# Patient Record
Sex: FEMALE | Race: WHITE | NOT HISPANIC OR LATINO | URBAN - METROPOLITAN AREA
[De-identification: names, ages, dates, MRNs, and addresses within clinical notes are randomized per-mention and may not be internally consistent; named-entity substitution may affect disease eponyms.]

---

## 2025-07-24 ENCOUNTER — HOSPITAL ENCOUNTER (EMERGENCY)
Facility: HOSPITAL | Age: 55
Discharge: HOME | End: 2025-07-24
Attending: STUDENT IN AN ORGANIZED HEALTH CARE EDUCATION/TRAINING PROGRAM
Payer: COMMERCIAL

## 2025-07-24 ENCOUNTER — APPOINTMENT (OUTPATIENT)
Dept: RADIOLOGY | Facility: HOSPITAL | Age: 55
End: 2025-07-24
Payer: COMMERCIAL

## 2025-07-24 VITALS
BODY MASS INDEX: 32.78 KG/M2 | DIASTOLIC BLOOD PRESSURE: 91 MMHG | SYSTOLIC BLOOD PRESSURE: 117 MMHG | OXYGEN SATURATION: 98 % | HEIGHT: 64 IN | TEMPERATURE: 98.1 F | RESPIRATION RATE: 16 BRPM | HEART RATE: 80 BPM | WEIGHT: 192 LBS

## 2025-07-24 DIAGNOSIS — M25.561 ACUTE PAIN OF RIGHT KNEE: Primary | ICD-10-CM

## 2025-07-24 PROCEDURE — 99284 EMERGENCY DEPT VISIT MOD MDM: CPT | Mod: 25 | Performed by: STUDENT IN AN ORGANIZED HEALTH CARE EDUCATION/TRAINING PROGRAM

## 2025-07-24 PROCEDURE — 73564 X-RAY EXAM KNEE 4 OR MORE: CPT | Mod: RIGHT SIDE | Performed by: RADIOLOGY

## 2025-07-24 PROCEDURE — 96374 THER/PROPH/DIAG INJ IV PUSH: CPT

## 2025-07-24 PROCEDURE — 96375 TX/PRO/DX INJ NEW DRUG ADDON: CPT

## 2025-07-24 PROCEDURE — 73564 X-RAY EXAM KNEE 4 OR MORE: CPT | Mod: RT

## 2025-07-24 PROCEDURE — 2500000004 HC RX 250 GENERAL PHARMACY W/ HCPCS (ALT 636 FOR OP/ED): Performed by: STUDENT IN AN ORGANIZED HEALTH CARE EDUCATION/TRAINING PROGRAM

## 2025-07-24 RX ORDER — KETOROLAC TROMETHAMINE 30 MG/ML
15 INJECTION, SOLUTION INTRAMUSCULAR; INTRAVENOUS ONCE
Status: COMPLETED | OUTPATIENT
Start: 2025-07-24 | End: 2025-07-24

## 2025-07-24 RX ORDER — METHOCARBAMOL 100 MG/ML
750 INJECTION, SOLUTION INTRAMUSCULAR; INTRAVENOUS ONCE
Status: COMPLETED | OUTPATIENT
Start: 2025-07-24 | End: 2025-07-24

## 2025-07-24 RX ORDER — METHOCARBAMOL 750 MG/1
750 TABLET, FILM COATED ORAL 3 TIMES DAILY PRN
Qty: 14 TABLET | Refills: 0 | Status: SHIPPED | OUTPATIENT
Start: 2025-07-24

## 2025-07-24 RX ORDER — OXYCODONE HYDROCHLORIDE 5 MG/1
5 TABLET ORAL EVERY 6 HOURS PRN
Qty: 5 TABLET | Refills: 0 | Status: SHIPPED | OUTPATIENT
Start: 2025-07-24

## 2025-07-24 RX ADMIN — KETOROLAC TROMETHAMINE 15 MG: 30 INJECTION, SOLUTION INTRAMUSCULAR at 16:15

## 2025-07-24 RX ADMIN — METHOCARBAMOL 750 MG: 1000 INJECTION, SOLUTION INTRAMUSCULAR; INTRAVENOUS at 16:14

## 2025-07-24 ASSESSMENT — PAIN SCALES - GENERAL
PAINLEVEL_OUTOF10: 3
PAINLEVEL_OUTOF10: 10 - WORST POSSIBLE PAIN
PAINLEVEL_OUTOF10: 4
PAINLEVEL_OUTOF10: 0 - NO PAIN

## 2025-07-24 ASSESSMENT — PAIN DESCRIPTION - ORIENTATION: ORIENTATION: RIGHT

## 2025-07-24 ASSESSMENT — PAIN DESCRIPTION - LOCATION: LOCATION: KNEE

## 2025-07-24 ASSESSMENT — PAIN - FUNCTIONAL ASSESSMENT: PAIN_FUNCTIONAL_ASSESSMENT: 0-10

## 2025-07-24 NOTE — ED PROVIDER NOTES
"[facility] ED Note    Date of Service: 7/24/2025  Reason for Visit: Knee Injury      Patient History     [facility]  Marilu Cruz is a 55 y.o. female who presents the emergency department for right knee pain.  Patient was power washing her house when she tripped backwards over a , twisted her knee and landed on her buttocks.  Patient felt tearing in her right knee with immediate pain and possible swelling.  She did not hit her head, she did not lose consciousness.  She reports pain of her right knee.  She states she tried to walk but was unable to do so and unable to put weight on it.  She rates the pain as a 10/10, worse with movement and better with rest.      Medical History[1]  Surgical History[2]      Physical Exam     Vitals:    07/24/25 1527 07/24/25 1600 07/24/25 1715 07/24/25 1801   BP: 123/72 126/87 (!) 117/91 (!) 117/91   Pulse: 73 76 79 80   Resp: 16 (!) 26 15 16   Temp: 36.7 °C (98.1 °F)      SpO2: 100% 100% 97% 98%   Weight: 87.1 kg (192 lb)      Height: 1.626 m (5' 4\")        General: Age-appropriate female, nontoxic, sitting comfortably in the gurney no acute distress.  Pulmonary:   Non-labored breathing. Breath sounds clear bilaterally  Cardiac:  Regular rate   Abdomen:  Soft. Non-tender. Non-distended  Musculoskeletal: Tenderness palpation of the right knee without appreciable effusion.  No overlying erythema.  Sensation intact to light touch.  Right 2+ DP/PT pulse.  Skin:  Dry, no rashes  Neuro: Alert and oriented x 4.  Moves all 4 extremity spontaneously.    Diagnostic Studies     Labs:  Please see EMR for labs obtained during this patient encounter.    Radiology:  Please see EMR for imaging obtained during this patient encounter.    ED Course and MDM     Marilu Cruz is a 55 y.o. female with a history and presentation as described above in [facility].      Upon presentation, the patient was afebrile, well-appearing, with unremarkable vital signs.  Patient presented to the emerged part with " right knee pain after twisting it while falling.  Differential diagnose includes fracture, soft tissue injury such as meniscal tear, possible MCL tear possible ACL tear.  Patient did not have any knee hyperextension, low concern for any vascular injury.  Patient had a 2+ right DP/PT pulse.  Patient's exam was concerning for possible soft tissue injury.  She was treated with Robaxin as well as Toradol for her pain with improvement on reassessment.  Patient distally did not show any fracture.  Patient was given a knee brace as well as crutches.  Was provided orthopedic surgery follow-up, was given oxycodone as well as Robaxin to go home with and subsequent discharged.      Impression     Diagnoses as of 07/24/25 2008   Acute pain of right knee        Plan       Discharge, see DCI provided      Be Churchill MD  [facility] Emergency Medicine           [1] No past medical history on file.  [2] No past surgical history on file.       Be Churchill MD  07/24/25 2009

## 2025-07-24 NOTE — ED TRIAGE NOTES
PT here from home working in yard,  fell and hurt rt knee. Pt is tearful denies hitting head and LOC.  Was given 20mcg of ketamine and Zofran by EMS..  Pain 10/10. No medical history.

## 2025-07-24 NOTE — DISCHARGE INSTRUCTIONS
You are seen in the emergency department for right knee pain.  We discussed that you most likely have a soft tissue injury.  You may use the Robaxin 3 times a day as needed for muscle spasms.  You may also use the oxycodone for breakthrough pain.  Please do not operate any heavy machinery or drive long distances with the Robaxin or the oxycodone.  We did give her referral to orthopedic surgery, we will help you make an appointment for this.  You may bear weight as tolerated on your right leg.  You may use ice, rest, and elevation as needed as well.

## 2025-07-28 ENCOUNTER — DOCUMENTATION (OUTPATIENT)
Dept: ORTHOPEDIC SURGERY | Facility: HOSPITAL | Age: 55
End: 2025-07-28

## 2025-07-28 ENCOUNTER — APPOINTMENT (OUTPATIENT)
Dept: ORTHOPEDIC SURGERY | Facility: HOSPITAL | Age: 55
End: 2025-07-28
Payer: COMMERCIAL

## 2025-07-28 ENCOUNTER — HOSPITAL ENCOUNTER (OUTPATIENT)
Dept: RADIOLOGY | Facility: HOSPITAL | Age: 55
Discharge: HOME | End: 2025-07-28
Payer: COMMERCIAL

## 2025-07-28 VITALS — WEIGHT: 190 LBS | BODY MASS INDEX: 30.53 KG/M2 | HEIGHT: 66 IN

## 2025-07-28 DIAGNOSIS — M25.361 INSTABILITY OF RIGHT KNEE JOINT: ICD-10-CM

## 2025-07-28 DIAGNOSIS — M25.361 INSTABILITY OF RIGHT KNEE JOINT: Primary | ICD-10-CM

## 2025-07-28 DIAGNOSIS — S83.511A NEW ACL TEAR, RIGHT, INITIAL ENCOUNTER: Primary | ICD-10-CM

## 2025-07-28 DIAGNOSIS — S83.511A NEW ACL TEAR, RIGHT, INITIAL ENCOUNTER: ICD-10-CM

## 2025-07-28 PROCEDURE — 99214 OFFICE O/P EST MOD 30 MIN: CPT

## 2025-07-28 PROCEDURE — 3008F BODY MASS INDEX DOCD: CPT | Performed by: EMERGENCY MEDICINE

## 2025-07-28 PROCEDURE — 99204 OFFICE O/P NEW MOD 45 MIN: CPT | Performed by: EMERGENCY MEDICINE

## 2025-07-28 PROCEDURE — 73721 MRI JNT OF LWR EXTRE W/O DYE: CPT | Mod: RIGHT SIDE | Performed by: RADIOLOGY

## 2025-07-28 PROCEDURE — 99214 OFFICE O/P EST MOD 30 MIN: CPT | Performed by: EMERGENCY MEDICINE

## 2025-07-28 PROCEDURE — 73721 MRI JNT OF LWR EXTRE W/O DYE: CPT | Mod: RT

## 2025-07-28 ASSESSMENT — PAIN SCALES - GENERAL: PAINLEVEL_OUTOF10: 6

## 2025-07-28 ASSESSMENT — PAIN - FUNCTIONAL ASSESSMENT: PAIN_FUNCTIONAL_ASSESSMENT: 0-10

## 2025-07-28 NOTE — PROGRESS NOTES
I reviewed MRI findings with patient.  She does have findings consistent with an ACL rupture as well as an MCL tear and concerns for posterior lateral corner injury.  I did discuss with patient that she should remain in the T scope brace.  I would like for her to follow-up closely with orthopedic surgery.  I have placed a referral for this.  I am more than happy see her back as needed, otherwise she will follow-up with orthopedic surgery.

## 2025-07-28 NOTE — PROGRESS NOTES
"Subjective   Patient ID: Marilu Cruz is a 55 y.o. female who presents for Pain of the Right Knee.  History of Present Illness  The patient is a 55-year-old female who presents for evaluation of a right knee injury.    On Thursday, 07/24/2025, she was power washing her house. She came down off a ladder onto the ground and then tripped over the  behind her. Her right leg got stuck under the ladder. She had a twisting injury and fell backwards. She heard cracks and pops in her knee. She had to call EMS because she could not get up or walk. She was taken to the ED where x-rays were performed, revealing no fractures.    Since Thursday, she has been unable to bear weight on her right leg, relying on crutches for mobility. Any attempt to put weight on the leg results in immediate severe pain. She reports a sensation of instability in her knee and experiences snapping and locking when walking with crutches. She is uncertain if her kneecap was displaced during the incident. She was discharged with a knee immobilizer and crutches, along with a prescription for Percocet, which she discontinued due to adverse effects. Her primary care physician prescribed Mobic, which has provided some relief. She also received tramadol 50 mg and meloxicam 15 mg from her primary care physician, which she can request as needed.    She has a history of an ACL injury in her left knee in 2016.    PAST SURGICAL HISTORY:  ACL injury in the left knee in 2016.    All other systems have been reviewed and are negative for complaint.      Objective     Ht 1.676 m (5' 6\")   Wt 86.2 kg (190 lb)   BMI 30.67 kg/m²      Physical Exam  - Musculoskeletal:    - Left knee:      - Full range of motion      - Negative valgus stress and varus stress      - ACL graft feels good      - Negative Lachman    - Right knee:      - Moderate effusion      - Little ecchymosis along the medial joint line      - Very limited flexion secondary to pain and " discomfort      - Positive valgus stress with pain and laxity      - Positive Lachman      - Extension intact    Imaging:   I have personally reviewed and agree with Radiologist interpretation of previous imaging studies performed prior to this visit.   STUDY:  Knee Radiographs; 7/24/2025 4:48 PM  INDICATION:  Pain.  COMPARISON:  None Available.  ACCESSION NUMBER(S):  [id number]  ORDERING CLINICIAN:  JOY ROBLES  TECHNIQUE:  Four view(s) of the right knee.  FINDINGS:    There is no displaced fracture.  The alignment is anatomic.  No soft  tissue abnormality is seen.  There is no joint effusion.  IMPRESSION:  No acute osseous abnormality.  Signed by Thom Barnes MD        1. Instability of right knee joint  MR knee right wo IV contrast    CANCELED: T-Scope Knee      2. New ACL tear, right, initial encounter  T-Scope Knee          Assessment & Plan  1. Right knee injury:  - Symptoms suggest potential tear in ACL and MCL; exact nature to be determined by MRI  - Discussed possibility of small plateau fracture not visible due to absence of large effusion  - Provide T-scope brace for support  - Order MRI of right knee to confirm diagnosis  - Advise walking only if comfortable; avoid weight-bearing if it causes discomfort  - Defer physical therapy until MRI results are available  - Continue current medication regimen:    - Meloxicam 15 mg once daily    - Tramadol 50 mg as needed for pain (as prescribed by primary care physician)    Patient was prescribed a T scope brace for instability.The patient is ambulatory with or without aid; but, has weakness, instability and/or deformity of their right knee which requires stabilization from this orthosis to improve their function.      Verbal and written instructions for the use, wear schedule, cleaning and application of this item were given.  Patient was instructed that should the brace result in increased pain, decreased sensation, increased swelling, or an overall  worsening of their medical condition, to please contact our office immediately.     Orthotic management and training was provided for skin care, modifications due to healing tissues, edema changes, interruption in skin integrity, and safety precautions with the orthosis.      Rafa Guadarrama DO         This medical note was created with the assistance of artificial intelligence (AI) for documentation purposes. The content has been reviewed and confirmed by the healthcare provider for accuracy and completeness. Patient consented to the use of audio recording and use of AI during their visit.

## 2025-07-29 ENCOUNTER — APPOINTMENT (OUTPATIENT)
Dept: ORTHOPEDIC SURGERY | Facility: CLINIC | Age: 55
End: 2025-07-29
Payer: COMMERCIAL